# Patient Record
Sex: MALE | Race: WHITE | NOT HISPANIC OR LATINO | Employment: OTHER | URBAN - METROPOLITAN AREA
[De-identification: names, ages, dates, MRNs, and addresses within clinical notes are randomized per-mention and may not be internally consistent; named-entity substitution may affect disease eponyms.]

---

## 2024-08-08 ENCOUNTER — HOSPITAL ENCOUNTER (EMERGENCY)
Facility: CLINIC | Age: 29
Discharge: HOME | End: 2024-08-08
Attending: EMERGENCY MEDICINE | Admitting: EMERGENCY MEDICINE
Payer: OTHER GOVERNMENT

## 2024-08-08 ENCOUNTER — APPOINTMENT (OUTPATIENT)
Dept: RADIOLOGY | Facility: CLINIC | Age: 29
End: 2024-08-08
Attending: EMERGENCY MEDICINE
Payer: OTHER GOVERNMENT

## 2024-08-08 VITALS
BODY MASS INDEX: 25.77 KG/M2 | OXYGEN SATURATION: 100 % | HEIGHT: 69 IN | DIASTOLIC BLOOD PRESSURE: 81 MMHG | HEART RATE: 80 BPM | RESPIRATION RATE: 20 BRPM | TEMPERATURE: 98 F | SYSTOLIC BLOOD PRESSURE: 139 MMHG | WEIGHT: 174 LBS

## 2024-08-08 DIAGNOSIS — S62.102A WRIST FRACTURE, LEFT, CLOSED, INITIAL ENCOUNTER: ICD-10-CM

## 2024-08-08 PROCEDURE — 29125 APPL SHORT ARM SPLINT STATIC: CPT | Mod: LT

## 2024-08-08 PROCEDURE — 73110 X-RAY EXAM OF WRIST: CPT | Mod: LT

## 2024-08-08 PROCEDURE — 250N000013 HC RX MED GY IP 250 OP 250 PS 637: Performed by: EMERGENCY MEDICINE

## 2024-08-08 PROCEDURE — 99284 EMERGENCY DEPT VISIT MOD MDM: CPT | Mod: 25

## 2024-08-08 RX ORDER — IBUPROFEN 600 MG/1
600 TABLET, FILM COATED ORAL EVERY 6 HOURS PRN
Qty: 60 TABLET | Refills: 0 | Status: SHIPPED | OUTPATIENT
Start: 2024-08-08

## 2024-08-08 RX ORDER — OXYCODONE HYDROCHLORIDE 5 MG/1
5 TABLET ORAL EVERY 6 HOURS PRN
Qty: 12 TABLET | Refills: 0 | Status: SHIPPED | OUTPATIENT
Start: 2024-08-08 | End: 2024-08-11

## 2024-08-08 RX ORDER — IBUPROFEN 600 MG/1
600 TABLET, FILM COATED ORAL ONCE
Status: COMPLETED | OUTPATIENT
Start: 2024-08-08 | End: 2024-08-08

## 2024-08-08 RX ORDER — OXYCODONE HYDROCHLORIDE 5 MG/1
5 TABLET ORAL ONCE
Status: COMPLETED | OUTPATIENT
Start: 2024-08-08 | End: 2024-08-08

## 2024-08-08 RX ADMIN — OXYCODONE HYDROCHLORIDE 5 MG: 5 TABLET ORAL at 23:23

## 2024-08-08 RX ADMIN — IBUPROFEN 600 MG: 600 TABLET ORAL at 23:23

## 2024-08-08 ASSESSMENT — ACTIVITIES OF DAILY LIVING (ADL): ADLS_ACUITY_SCORE: 35

## 2024-08-08 ASSESSMENT — COLUMBIA-SUICIDE SEVERITY RATING SCALE - C-SSRS
2. HAVE YOU ACTUALLY HAD ANY THOUGHTS OF KILLING YOURSELF IN THE PAST MONTH?: NO
1. IN THE PAST MONTH, HAVE YOU WISHED YOU WERE DEAD OR WISHED YOU COULD GO TO SLEEP AND NOT WAKE UP?: NO
6. HAVE YOU EVER DONE ANYTHING, STARTED TO DO ANYTHING, OR PREPARED TO DO ANYTHING TO END YOUR LIFE?: NO

## 2024-08-08 ASSESSMENT — ENCOUNTER SYMPTOMS
ARTHRALGIAS: 1
NUMBNESS: 0

## 2024-08-09 NOTE — DISCHARGE INSTRUCTIONS
Make sure that you keep your splint clean and dry.  Make sure that you follow-up with your orthopedic doctor when you return back to [place].

## 2024-08-09 NOTE — ED PROVIDER NOTES
EMERGENCY DEPARTMENT ENCOUNTER      NAME: Marito Ly  AGE: 29 year old male  YOB: 1995  MRN: [id number]  EVALUATION DATE & TIME: 8/8/2024 10:07 PM    PCP: System, Provider Not In    ED PROVIDER: Nabila Gongora M.D.      Chief Complaint   Patient presents with    Wrist Pain         FINAL IMPRESSION:  1. Wrist fracture, left, closed, initial encounter        MEDICAL DECISION MAKING:    Pertinent Labs & Imaging studies reviewed. (See chart for details)  ED Course as of 08/09/24 0006   Thu Aug 08, 2024   2325 Afebrile.  Vital signs here unremarkable.  Patient is coming in for evaluation after fall.  Playing soccer, fell and caught himself with his left wrist.  Left wrist deformity.  Appropriate radial and ulnar pulses.  No other injury.  Did not strike head or lose consciousness.    Exam for patient here shows dorsal deformity to left wrist.  Appropriate radial and ulnar pulses.  Can move the fingers.  Sensory intact distally.  Appropriate capillary refill.  No tenderness on palpation of elbow.  Does have tenderness with pronation and supination.       2327 Independent review of x-ray reveals comminuted displaced intra-articular fracture of the distal left radius.  Slight dorsal displacement and angulation of the distal fracture fragment.   2327 Performed some fracture manipulation, seem to align with dorsal angulation improved.  Placed in splint with stirrup splint and forearm splint.  Patient is currently in [facility] visiting, is from [place].  States he is going back home on Monday.  Will follow-up with orthopedics when he is back home.         Medical Decision Making  Obtained supplemental history:Supplemental history obtained?: No  Reviewed external records: External records reviewed?: Documented in chart  Care impacted by chronic illness:N/A  Care significantly affected by social determinants of health:Access to Medical Care  Did you consider but not order tests?: Work up considered but not  performed and documented in chart, if applicable  Did you interpret images independently?: Independent interpretation of ECG and images noted in documentation, when applicable.  Consultation discussion with other provider:Did you involve another provider (consultant, , pharmacy, etc.)?: No  Discharge. I prescribed additional prescription strength medication(s) as charted. See documentation for any additional details.      Critical care: 0 minutes excluding separately billable procedures.  Includes bedside management, time reviewing test results, review of records, discussing the case with staff, documenting the medical record and time spent with family members (or surrogate decision makers) discussing specific treatment issues.          ED COURSE:  11:05 PM I met with the patient, obtained history, performed an initial exam, and discussed options and plan for diagnostics and treatment here in the ED.     The importance of close follow up was discussed. We reviewed warning signs and symptoms, and I instructed Mr. Ly to return to the emergency department immediately if he develops any new or worsening symptoms. I provided additional verbal discharge instructions. Mr. Ly expressed understanding and agreement with this plan of care, his questions were answered, and he was discharged in stable condition.     MEDICATIONS GIVEN IN THE EMERGENCY:  Medications   oxyCODONE (ROXICODONE) tablet 5 mg (5 mg Oral $Given 8/8/24 2323)   ibuprofen (ADVIL/MOTRIN) tablet 600 mg (600 mg Oral $Given 8/8/24 2323)       NEW PRESCRIPTIONS STARTED AT TODAY'S ER VISIT:  Discharge Medication List as of 8/8/2024 11:29 PM        START taking these medications    Details   ibuprofen (ADVIL/MOTRIN) 600 MG tablet Take 1 tablet (600 mg) by mouth every 6 hours as needed for moderate pain, Disp-60 tablet, R-0, Local Print      oxyCODONE (ROXICODONE) 5 MG tablet Take 1 tablet (5 mg) by mouth every 6 hours as needed for pain, Disp-12 tablet,  "R-0, Local Print                =================================================================    HPI    Patient information was obtained from: The patient    Use of : N/A         Marito Ly is a 29 year old male who presents with wrist pain.    The patient was playing soccer earlier today and fell onto the ground and braced for impact with his left hand. He is now complaining of significant left wrist pain and has difficulty moving his fingers due to the pain. He does not have any numbness to her fingers. No head trauma or loss of consciousness during the fall. No other associated symptoms at this time.      REVIEW OF SYSTEMS   Review of Systems   Musculoskeletal:  Positive for arthralgias (left wrist).   Neurological:  Negative for numbness.   All other systems reviewed and are negative.        PAST MEDICAL HISTORY:  No past medical history on file.    PAST SURGICAL HISTORY:  No past surgical history on file.    CURRENT MEDICATIONS:    No current facility-administered medications for this encounter.    Current Outpatient Medications:     ibuprofen (ADVIL/MOTRIN) 600 MG tablet, Take 1 tablet (600 mg) by mouth every 6 hours as needed for moderate pain, Disp: 60 tablet, Rfl: 0    oxyCODONE (ROXICODONE) 5 MG tablet, Take 1 tablet (5 mg) by mouth every 6 hours as needed for pain, Disp: 12 tablet, Rfl: 0    ALLERGIES:  No Known Allergies    FAMILY HISTORY:  No family history on file.    SOCIAL HISTORY:   Social History     Socioeconomic History    Marital status: Single       PHYSICAL EXAM:    Vitals: /81 (BP Location: Right arm)   Pulse 80   Temp 98  F (36.7  C) (Oral)   Resp 20   Ht 1.753 m (5' 9\")   Wt 78.9 kg (174 lb)   SpO2 100%   BMI 25.70 kg/m     General:. Alert and interactive, comfortable appearing.  HENT: Oropharynx without erythema or exudates. MMM.  TMs clear bilaterally.  Eyes: Pupils mid-sized and equally reactive.   Neck: Full AROM.  No midline tenderness to " palpation.  Cardiovascular: Regular rate and rhythm. Peripheral pulses 2+ bilaterally.  Chest/Pulmonary: Normal work of breathing. Lung sounds clear and equal throughout, no wheezes or crackles. No chest wall tenderness or deformities.  Abdomen: Soft, nondistended. Nontender without guarding or rebound.  Back/Spine: No CVA or midline tenderness.  Extremities: Dorsal deformity to left wrist. Appropriate radial and ulnar pulses. Can move the fingers. Sensory intact distally. Appropriate capillary refill. No tenderness on palpation of elbow. Does have tenderness with pronation and supination. No lower extremity edema.   Skin: Warm and dry. Normal skin color.   Neuro: Speech clear. CNs grossly intact. Moves all extremities appropriately. Strength and sensation grossly intact to all extremities.   Psych: Normal affect/mood, cooperative, memory appropriate.     LAB:  All pertinent labs reviewed and interpreted.  Labs Ordered and Resulted from Time of ED Arrival to Time of ED Departure - No data to display    RADIOLOGY:  XR Wrist Left G/E 3 Views   Final Result   IMPRESSION: Oblique comminuted and displaced intra-articular fracture involving the distal left radial metaphysis. Fracture lines extend to the distal articular surface where there is a step-off. Dorsal displacement and angulation of distal fracture    fragments. Mildly displaced ulnar styloid fracture fragment may be acute or chronic. Remainder unremarkable.          EKG:  See MDM    I have independently reviewed and interpreted the EKG(s) documented above.       PROCEDURES:     PROCEDURE: Splint Placement   INDICATIONS: Oblique comminuted and displaced intra-articular fracture involving the distal left radial metaphysis.    PROCEDURE PROVIDER: Dr Misty Gongora   NOTE:  A Sugar tong and Volar wrist splint made of Orthoglass was applied to the Left upper extremity by the above provider. As noted in the physical exam, distal CMS was intact prior to placement. The  splint was checked and the fit was adjusted to ensure proper positioning after placement. Sensation and circulation, as well as motor function, are unchanged after splint placement and the patient is more comfortable with the splint in place.           I, Tristen Perez, am serving as a scribe to document services personally performed by Dr. Nabila Gongora  based on my observation and the provider's statements to me. I, Nabila Gongora MD attest that Tristen Perez is acting in a scribe capacity, has observed my performance of the services and has documented them in accordance with my direction.      Nabila Gongora M.D.  Emergency Medicine  [facility] EMERGENCY ROOM  [address] [facility] [address]  [phone]  Dept: [phone]     Nabila Gongora MD  08/09/24 0007

## 2024-08-09 NOTE — ED TRIAGE NOTES
Pt fell while playing soccer, caught himself on L wrist, +deformity. CMS intact.     Triage Assessment (Adult)       Row Name 08/08/24 8034          Triage Assessment    Airway WDL WDL        Respiratory WDL    Respiratory WDL WDL        Skin Circulation/Temperature WDL    Skin Circulation/Temperature WDL WDL        Cardiac WDL    Cardiac WDL WDL        Peripheral/Neurovascular WDL    Peripheral Neurovascular WDL WDL        Cognitive/Neuro/Behavioral WDL    Cognitive/Neuro/Behavioral WDL WDL